# Patient Record
Sex: FEMALE | Race: WHITE | NOT HISPANIC OR LATINO | Employment: FULL TIME | URBAN - METROPOLITAN AREA
[De-identification: names, ages, dates, MRNs, and addresses within clinical notes are randomized per-mention and may not be internally consistent; named-entity substitution may affect disease eponyms.]

---

## 2017-04-03 ENCOUNTER — HOSPITAL ENCOUNTER (OUTPATIENT)
Dept: LAB | Facility: MEDICAL CENTER | Age: 52
End: 2017-04-03
Attending: INTERNAL MEDICINE
Payer: COMMERCIAL

## 2017-04-03 ENCOUNTER — OFFICE VISIT (OUTPATIENT)
Dept: ENDOCRINOLOGY | Facility: MEDICAL CENTER | Age: 52
End: 2017-04-03
Payer: COMMERCIAL

## 2017-04-03 VITALS
BODY MASS INDEX: 33.63 KG/M2 | HEART RATE: 76 BPM | HEIGHT: 64 IN | SYSTOLIC BLOOD PRESSURE: 112 MMHG | OXYGEN SATURATION: 96 % | WEIGHT: 197 LBS | DIASTOLIC BLOOD PRESSURE: 72 MMHG

## 2017-04-03 DIAGNOSIS — E06.3 HASHIMOTO'S THYROIDITIS: ICD-10-CM

## 2017-04-03 LAB
T4 FREE SERPL-MCNC: 1.09 NG/DL (ref 0.58–1.64)
TSH SERPL DL<=0.005 MIU/L-ACNC: 2.37 UIU/ML (ref 0.35–5.5)

## 2017-04-03 PROCEDURE — 84443 ASSAY THYROID STIM HORMONE: CPT

## 2017-04-03 PROCEDURE — 99214 OFFICE O/P EST MOD 30 MIN: CPT | Performed by: INTERNAL MEDICINE

## 2017-04-03 PROCEDURE — 84439 ASSAY OF FREE THYROXINE: CPT

## 2017-04-03 PROCEDURE — 36415 COLL VENOUS BLD VENIPUNCTURE: CPT

## 2017-04-03 RX ORDER — THYROID 90 MG/1
90 TABLET ORAL DAILY
Qty: 90 TAB | Refills: 3 | Status: SHIPPED | OUTPATIENT
Start: 2017-04-03

## 2017-04-03 RX ORDER — NICOTINE POLACRILEX 2 MG
LOZENGE BUCCAL
COMMUNITY

## 2017-04-03 NOTE — PROGRESS NOTES
"Endocrinology Clinic Progress Note    CC: Hypothyroidism    HPI:  Debi Eisenberg is a 51 y.o. old patient who comes in today for routine follow up. Patient has previously seen Dr. Raman. She was diagnosed with hypothyroidism 14 years ago. She was found to have TSH of 17 at that time. She was started on levothyroxine at the time of diagnosis, she is currently on levothyroxine 125 µg daily. About 5 years ago she was on [place] Thyroid, she felt much better on [place] Thyroid. She complains of difficulty losing weight, feels tired, has muscle aches, she sleeps 10-12 hours at night and still feels tired in the morning. She complains of dry skin. No family history of hypothyroidism. She had thyroid ultrasound in November 2016 which did not show any focal nodules, it showed heterogeneous appearance of thyroid.    ROS:  Constitutional: No unintentional weight loss  Cardiac: No palpitations or racing heart    Past Medical History:  Patient Active Problem List    Diagnosis Date Noted   • Chronic autoimmune thyroiditis 12/14/2015   • Hypothyroidism, acquired, autoimmune 12/14/2015   • Cholelithiasis        Medications:    Current outpatient prescriptions:   •  Cyanocobalamin (B-12) 5000 MCG SL Tab, Place  under tongue., Disp: , Rfl:   •  Coenzyme Q10 (COQ10 PO), Take  by mouth., Disp: , Rfl:   •  ASCORBIC ACID PO, Take  by mouth., Disp: , Rfl:   •  thyroid (ARMOUR THYROID) 90 MG Tab, Take 1 Tab by mouth every day., Disp: 90 Tab, Rfl: 3    Labs:  Labs and October 2016: TSH 0.78, free T4 1.11    Physical Examination:  Vital signs: /72 mmHg  Pulse 76  Ht 1.626 m (5' 4\")  Wt 89.359 kg (197 lb)  BMI 33.80 kg/m2  SpO2 96%  General: No apparent distress, cooperative  Eyes: No scleral icterus, no discharge  Resp: Normal effort, clear to auscultation bilaterally  CVS: Regular rate and rhythm, S1 S2 normal, no murmur  Extremities: No lower extremity edema  Psych: Alert and oriented, normal mood and " affect    Assessment and Plan:    1. Hashimoto's thyroiditis  · We discussed about hypothalamus-pituitary-thyroid axis and its regulation  · We discussed about treatment of hypothyroidism  · As she felt better on [place] Thyroid in the past we will switch to [place] Thyroid, starting with 90 mg daily  · Repeat labs for TSH and free T4 every 6-8 weeks for the next few months  - FREE THYROXINE; Future  - TSH; Future  - TSH; Future  - FREE THYROXINE; Future  - FREE THYROXINE; Future  - TSH; Future  - thyroid (ARMOUR THYROID) 90 MG Tab; Take 1 Tab by mouth every day.  Dispense: 90 Tab; Refill: 3    This was a 25 minutes face-to-face encounter, greater than 50% of the time was spent in counseling.    Return in about 6 months (around 10/3/2017).    Thank you for allowing me to participate in the care of this patient.    Teresa Mina M.D.    This note was created using voice recognition software (Dragon). The accuracy of the dictation is limited by the abilities of the software. I have reviewed the note prior to signing, however some errors in grammar and context are still possible. If you have any questions related to this note please do not hesitate to contact our office.

## 2017-04-03 NOTE — MR AVS SNAPSHOT
"        Debi Davenportapolinarstephen   4/3/2017 7:20 AM   Office Visit   MRN: [id number]    Department:  Endocrinology Med [facility]   Dept Phone:  [phone]    Description:  Female : 1965   Provider:  Teresa Mina M.D.           Reason for Visit     New Patient Hypothyroidism      Allergies as of 4/3/2017     Allergen Noted Reactions    Latex 2017         You were diagnosed with     Hashimoto's thyroiditis   [[id number]]         Vital Signs     Blood Pressure Pulse Height Weight Body Mass Index Oxygen Saturation    112/72 mmHg 76 1.626 m (5' 4\") 89.359 kg (197 lb) 33.80 kg/m2 96%    Smoking Status                   Never Smoker            Basic Information     Date Of Birth Sex Race Ethnicity Preferred Language    1965 Female White Non- English      Problem List              ICD-10-CM Priority Class Noted - Resolved    Chronic autoimmune thyroiditis E06.3   2015 - Present    Hypothyroidism, acquired, autoimmune E03.8   2015 - Present    Cholelithiasis K80.20   Unknown - Present      Health Maintenance        Date Due Completion Dates    IMM DTaP/Tdap/Td Vaccine (1 - Tdap) 1986 ---    PAP SMEAR 1988 ---    MAMMOGRAM 2007 ---            Current Immunizations     No immunizations on file.      Below and/or attached are the medications your provider expects you to take. Review all of your home medications and newly ordered medications with your provider and/or pharmacist. Follow medication instructions as directed by your provider and/or pharmacist. Please keep your medication list with you and share with your provider. Update the information when medications are discontinued, doses are changed, or new medications (including over-the-counter products) are added; and carry medication information at all times in the event of emergency situations     Allergies:  LATEX - (reactions not documented)               Medications  Valid as of: 2017 -  8:31 AM    Generic Name " Brand Name Tablet Size Instructions for use    Ascorbic Acid   Take  by mouth.        Coenzyme Q10   Take  by mouth.        Cyanocobalamin (SL Tab) B-12 5000 MCG Place  under tongue.        Thyroid (Tab) ARMOUR THYROID 90 MG Take 1 Tab by mouth every day.        .                 Medicines prescribed today were sent to:     [email or web address] [place] [address] [address] [address]    Phone: [phone] Fax: [phone]    Open 24 Hours?: No      Medication refill instructions:       If your prescription bottle indicates you have medication refills left, it is not necessary to call your provider’s office. Please contact your pharmacy and they will refill your medication.    If your prescription bottle indicates you do not have any refills left, you may request refills at any time through one of the following ways: The online [employer] system (except Urgent Care), by calling your provider’s office, or by asking your pharmacy to contact your provider’s office with a refill request. Medication refills are processed only during regular business hours and may not be available until the next business day. Your provider may request additional information or to have a follow-up visit with you prior to refilling your medication.   *Please Note: Medication refills are assigned a new Rx number when refilled electronically. Your pharmacy may indicate that no refills were authorized even though a new prescription for the same medication is available at the pharmacy. Please request the medicine by name with the pharmacy before contacting your provider for a refill.        Your To Do List     Future Labs/Procedures Complete By Expires    FREE THYROXINE  As directed 4/3/2018    FREE THYROXINE  As directed 4/3/2018    FREE THYROXINE  As directed 4/3/2018    TSH  As directed 4/3/2018    TSH  As directed 4/3/2018    TSH  As directed 4/3/2018         Tw[employer]xt Access Code: Activation code not  generated  Current MyChart Status: Active

## 2019-10-04 ENCOUNTER — HOSPITAL ENCOUNTER (EMERGENCY)
Dept: HOSPITAL 76 - ED | Age: 54
Discharge: HOME | End: 2019-10-04
Payer: SELF-PAY

## 2019-10-04 VITALS — SYSTOLIC BLOOD PRESSURE: 128 MMHG | DIASTOLIC BLOOD PRESSURE: 68 MMHG

## 2019-10-04 DIAGNOSIS — H66.004: Primary | ICD-10-CM

## 2019-10-04 PROCEDURE — 99283 EMERGENCY DEPT VISIT LOW MDM: CPT

## 2019-10-04 PROCEDURE — 99282 EMERGENCY DEPT VISIT SF MDM: CPT

## 2019-10-04 NOTE — ED PHYSICIAN DOCUMENTATION
PD HPI HEENT





- Stated complaint


Stated Complaint: RIGHT EAR PX





- Chief complaint


Chief Complaint: Heent





- History obtained from


History obtained from: Patient





- History of Present Illness


Timing - onset: Today (She has a history of hearing loss due to ear infection on

the right with a resolved perforation, today she is had some right ear pain with

increased over baseline hearing loss.  No fevers.)





Review of Systems


Constitutional: denies: Fever, Chills


Eyes: denies: Loss of vision, Decreased vision


Ears: reports: Loss of hearing, Ear pain.  denies: Drainage/discharge, 

Tinnitus/ringing


Nose: denies: Rhinorrhea / runny nose





PD PAST MEDICAL HISTORY





- Present Medications


Home Medications: 


                                Ambulatory Orders











 Medication  Instructions  Recorded  Confirmed


 


Amox/Clav 875/125 [Augmentin] 1 each PO Q12H #20 tablet 10/04/19 


 


Guaifenesin/Pseudoephedrne HCl 1 each PO BID PRN #20 tab.er.12h 10/04/19 





[Mucinex D -60 mg Tablet]   














- Allergies


Allergies/Adverse Reactions: 


                                    Allergies











Allergy/AdvReac Type Severity Reaction Status Date / Time


 


No Known Drug Allergies Allergy   Verified 10/04/19 20:52














PD ED PE NORMAL





- Vitals


Vital signs reviewed: Yes





- General


General: Alert and oriented X 3, No acute distress





- HEENT


HEENT: Other (She has early otitis media on the right with evidence of a healed 

perforation, no drainage or external otitis.)





- Neck


Neck: Supple, no meningeal sign, No bony TTP





- Neuro


Neuro: Alert and oriented X 3, Normal speech





Results





- Vitals


Vitals: 





                               Vital Signs - 24 hr











  10/04/19





  20:52


 


Temperature 36.7 C


 


Heart Rate 77


 


Respiratory 16





Rate 


 


Blood Pressure 128/68


 


O2 Saturation 98








                                     Oxygen











O2 Source                      Room air

















Departure





- Departure


Disposition: 01 Home, Self Care


Clinical Impression: 


Right otitis media


Qualifiers:


 Otitis media type: suppurative Chronicity: acute Recurrence: recurrent 

Spontaneous tympanic membrane rupture: without spontaneous rupture Qualified 

Code(s): H66.004 - Acute suppurative otitis media without spontaneous rupture of

ear drum, recurrent, right ear





Condition: Good


Record reviewed to determine appropriate education?: Yes


Instructions:  ED Otitis Media Acute Adult


Prescriptions: 


Amox/Clav 875/125 [Augmentin] 1 each PO Q12H #20 tablet


Guaifenesin/Pseudoephedrne HCl [Mucinex D -60 mg Tablet] 1 each PO BID PRN

#20 tab.er.12h


 PRN Reason: congestion


Comments: 


If you are having persistent symptoms in a week you can follow-up with a local 

audiologist, Dr. Lori Lopes, she is here in [place].  The phone number 

[phone].  The address is [facility] [place], [address]





Return for new worsening symptoms.

## 2019-10-22 ENCOUNTER — HOSPITAL ENCOUNTER (EMERGENCY)
Dept: HOSPITAL 76 - ED | Age: 54
Discharge: HOME | End: 2019-10-22
Payer: SELF-PAY

## 2019-10-22 VITALS — SYSTOLIC BLOOD PRESSURE: 122 MMHG | DIASTOLIC BLOOD PRESSURE: 70 MMHG

## 2019-10-22 DIAGNOSIS — J01.00: Primary | ICD-10-CM

## 2019-10-22 DIAGNOSIS — H73.892: ICD-10-CM

## 2019-10-22 DIAGNOSIS — H72.91: ICD-10-CM

## 2019-10-22 PROCEDURE — 99282 EMERGENCY DEPT VISIT SF MDM: CPT

## 2019-10-22 PROCEDURE — 99283 EMERGENCY DEPT VISIT LOW MDM: CPT

## 2019-10-22 NOTE — ED PHYSICIAN DOCUMENTATION
PD HPI HEENT FB





- Chief complaint


Chief Complaint: Heent





- History obtained from


History obtained from: Patient





- History of Present Illness


Timing - onset: Other (53-year-old woman with chronic perforation of the right 

TM presents now with chills and left-sided headache starting yesterday.  She has

sinus congestion.  No cough.)





Review of Systems


Constitutional: reports: Chills, Fatigue.  denies: Fever


Nose: reports: Rhinorrhea / runny nose, Congestion, Sinus pressure / pain


Throat: denies: Sore throat


Cardiac: denies: Chest pain / pressure, Palpitations





PD PAST MEDICAL HISTORY





- Past Medical History


Cardiovascular: None


Respiratory: None


Neuro: None


Endocrine/Autoimmune: Other


GI: None


GYN: None


: None


HEENT: None


Psych: None


Musculoskeletal: None


Derm: None





- Past Surgical History


Past Surgical History: Yes


General: Cholecystectomy


/GYN: Hysterectomy





- Present Medications


Home Medications: 


                                Ambulatory Orders











 Medication  Instructions  Recorded  Confirmed


 


Amox/Clav 875/125 [Augmentin] 1 each PO Q12H #20 tablet 10/04/19 


 


Guaifenesin/Pseudoephedrne HCl 1 each PO BID PRN #20 tab.er.12h 10/04/19 





[Mucinex D -60 mg Tablet]   


 


Amoxicillin 500 mg PO TID #30 capsule 10/22/19 


 


predniSONE [Deltasone] 60 mg PO DAILY 5 Days #15 tablet 10/22/19 














- Allergies


Allergies/Adverse Reactions: 


                                    Allergies











Allergy/AdvReac Type Severity Reaction Status Date / Time


 


latex AdvReac  Rash Verified 10/22/19 16:37














- Social History


Does the pt smoke?: No


Smoking Status: Never smoker


Does the pt drink ETOH?: No


Does the pt have substance abuse?: No





- Immunizations


Immunizations are current?: No





- POLST


Patient has POLST: No





PD ED PE NORMAL





- Vitals


Vital signs reviewed: Yes





- General


General: Alert and oriented X 3, No acute distress





- HEENT


HEENT: PERRL, EOMI, Other (Chronic appearing perforation of the right TM without

 otitis.  Left TM is retracted without otitis.  She is tender over the right 

maxillary and frontal sinuses.)





- Derm


Derm: No rash





- Neuro


Neuro: Alert and oriented X 3, CNs 2-12 intact





- Psych


Psych: Normal mood, Normal affect





Results





- Vitals


Vitals: 





                               Vital Signs - 24 hr











  10/22/19





  16:37


 


Temperature 37.4 C


 


Heart Rate 96


 


Respiratory 18





Rate 


 


Blood Pressure 124/73


 


O2 Saturation 97








                                     Oxygen











O2 Source                      Room air

















Departure





- Departure


Disposition: 01 Home, Self Care


Clinical Impression: 


Sinusitis


Qualifiers:


 Sinusitis location: maxillary Chronicity: acute Recurrence: non-recurrent 

Qualified Code(s): J01.00 - Acute maxillary sinusitis, unspecified





Condition: Good


Record reviewed to determine appropriate education?: Yes


Instructions:  ED Sinusitis Abx Tx


Prescriptions: 


Amoxicillin 500 mg PO TID #30 capsule


predniSONE [Deltasone] 60 mg PO DAILY 5 Days #15 tablet


Comments: 


Continue your efforts to try to see an ear nose and throat doctor, again the 

closest is in [place].  The phone number is [phone].